# Patient Record
Sex: FEMALE | URBAN - METROPOLITAN AREA
[De-identification: names, ages, dates, MRNs, and addresses within clinical notes are randomized per-mention and may not be internally consistent; named-entity substitution may affect disease eponyms.]

---

## 2022-02-23 ENCOUNTER — OFFICE VISIT (OUTPATIENT)
Dept: URBAN - METROPOLITAN AREA CLINIC 31 | Facility: CLINIC | Age: 52
End: 2022-02-23

## 2022-02-23 NOTE — HPI-CONSTIPATION
53 y/o female patient admits recent onset/longstanding history of (diarrhea/constipation/varying bowel habits/change in bowel habits). The onset was --- ago.  Patient currently admits __ soft/formed/watery bowel movements per day. Patient ___ blood, mucus, or melena in stools.  Patient states that their previous bowel habits were __ movements per __, with __ and __ stools.  Patient notes ____ aggravating factors and ___ alleviating factors and has tried __ medications with __ relief of symptoms.   Patient admits/denies associated abdominal pains/cramps, bloating, and gas. Patient denies/admits she/he has incomplete defecation, feels uncontrollably urgent, or difficulty passing bowel movements. Patient denies/admits fecal incontinence (conscious/unconscious).  Patient denies/admits recently drinking lake or well water, any changes in medications, or taking any antibiotics in the last 6 months to treat ---.   Patient admits/denies having a colonoscopy in ---- with --- findings.  It was performed by  __.    Patient admits/denies family hx of colonic disease/cancer/polyps.

## 2022-03-22 ENCOUNTER — OFFICE VISIT (OUTPATIENT)
Dept: URBAN - METROPOLITAN AREA CLINIC 35 | Facility: CLINIC | Age: 52
End: 2022-03-22